# Patient Record
Sex: MALE | URBAN - METROPOLITAN AREA
[De-identification: names, ages, dates, MRNs, and addresses within clinical notes are randomized per-mention and may not be internally consistent; named-entity substitution may affect disease eponyms.]

---

## 2017-11-01 ENCOUNTER — OUTPATIENT (OUTPATIENT)
Dept: OUTPATIENT SERVICES | Facility: HOSPITAL | Age: 62
LOS: 1 days | End: 2017-11-01
Payer: MEDICAID

## 2017-11-01 PROCEDURE — G9001: CPT

## 2017-11-15 ENCOUNTER — EMERGENCY (EMERGENCY)
Facility: HOSPITAL | Age: 62
LOS: 1 days | Discharge: HOME | End: 2017-11-15
Attending: EMERGENCY MEDICINE | Admitting: INTERNAL MEDICINE
Payer: MEDICAID

## 2017-11-15 VITALS — RESPIRATION RATE: 16 BRPM | HEART RATE: 84 BPM | SYSTOLIC BLOOD PRESSURE: 148 MMHG | DIASTOLIC BLOOD PRESSURE: 78 MMHG

## 2017-11-15 DIAGNOSIS — R07.9 CHEST PAIN, UNSPECIFIED: ICD-10-CM

## 2017-11-15 LAB
ALBUMIN SERPL ELPH-MCNC: 4.8 G/DL — SIGNIFICANT CHANGE UP (ref 3.3–5)
ALP SERPL-CCNC: 54 U/L — SIGNIFICANT CHANGE UP (ref 40–120)
ALT FLD-CCNC: 21 U/L RC — SIGNIFICANT CHANGE UP (ref 10–45)
ANION GAP SERPL CALC-SCNC: 12 MMOL/L — SIGNIFICANT CHANGE UP (ref 5–17)
APPEARANCE UR: CLEAR — SIGNIFICANT CHANGE UP
APTT BLD: 35.9 SEC — SIGNIFICANT CHANGE UP (ref 27.5–37.4)
AST SERPL-CCNC: 21 U/L — SIGNIFICANT CHANGE UP (ref 10–40)
BASOPHILS # BLD AUTO: 0 K/UL — SIGNIFICANT CHANGE UP (ref 0–0.2)
BASOPHILS NFR BLD AUTO: 0.2 % — SIGNIFICANT CHANGE UP (ref 0–2)
BILIRUB SERPL-MCNC: 0.3 MG/DL — SIGNIFICANT CHANGE UP (ref 0.2–1.2)
BILIRUB UR-MCNC: NEGATIVE — SIGNIFICANT CHANGE UP
BUN SERPL-MCNC: 14 MG/DL — SIGNIFICANT CHANGE UP (ref 7–23)
CALCIUM SERPL-MCNC: 9.9 MG/DL — SIGNIFICANT CHANGE UP (ref 8.4–10.5)
CHLORIDE SERPL-SCNC: 104 MMOL/L — SIGNIFICANT CHANGE UP (ref 96–108)
CO2 SERPL-SCNC: 29 MMOL/L — SIGNIFICANT CHANGE UP (ref 22–31)
COLOR SPEC: SIGNIFICANT CHANGE UP
CREAT SERPL-MCNC: 1.12 MG/DL — SIGNIFICANT CHANGE UP (ref 0.5–1.3)
DIFF PNL FLD: NEGATIVE — SIGNIFICANT CHANGE UP
EOSINOPHIL # BLD AUTO: 0.2 K/UL — SIGNIFICANT CHANGE UP (ref 0–0.5)
EOSINOPHIL NFR BLD AUTO: 3.8 % — SIGNIFICANT CHANGE UP (ref 0–6)
ERYTHROCYTE [SEDIMENTATION RATE] IN BLOOD: 5 MM/HR — SIGNIFICANT CHANGE UP (ref 0–20)
GAS PNL BLDV: SIGNIFICANT CHANGE UP
GLUCOSE BLDC GLUCOMTR-MCNC: 163 MG/DL — HIGH (ref 70–99)
GLUCOSE BLDC GLUCOMTR-MCNC: 94 MG/DL — SIGNIFICANT CHANGE UP (ref 70–99)
GLUCOSE SERPL-MCNC: 133 MG/DL — HIGH (ref 70–99)
GLUCOSE UR QL: NEGATIVE — SIGNIFICANT CHANGE UP
HCT VFR BLD CALC: 43 % — SIGNIFICANT CHANGE UP (ref 39–50)
HGB BLD-MCNC: 14.6 G/DL — SIGNIFICANT CHANGE UP (ref 13–17)
INR BLD: 1.01 RATIO — SIGNIFICANT CHANGE UP (ref 0.88–1.16)
KETONES UR-MCNC: NEGATIVE — SIGNIFICANT CHANGE UP
LEUKOCYTE ESTERASE UR-ACNC: NEGATIVE — SIGNIFICANT CHANGE UP
LYMPHOCYTES # BLD AUTO: 1.7 K/UL — SIGNIFICANT CHANGE UP (ref 1–3.3)
LYMPHOCYTES # BLD AUTO: 31.6 % — SIGNIFICANT CHANGE UP (ref 13–44)
MAGNESIUM SERPL-MCNC: 2.3 MG/DL — SIGNIFICANT CHANGE UP (ref 1.6–2.6)
MCHC RBC-ENTMCNC: 31.4 PG — SIGNIFICANT CHANGE UP (ref 27–34)
MCHC RBC-ENTMCNC: 34.1 GM/DL — SIGNIFICANT CHANGE UP (ref 32–36)
MCV RBC AUTO: 92.2 FL — SIGNIFICANT CHANGE UP (ref 80–100)
MONOCYTES # BLD AUTO: 0.4 K/UL — SIGNIFICANT CHANGE UP (ref 0–0.9)
MONOCYTES NFR BLD AUTO: 7.1 % — SIGNIFICANT CHANGE UP (ref 2–14)
NEUTROPHILS # BLD AUTO: 3 K/UL — SIGNIFICANT CHANGE UP (ref 1.8–7.4)
NEUTROPHILS NFR BLD AUTO: 57.4 % — SIGNIFICANT CHANGE UP (ref 43–77)
NITRITE UR-MCNC: NEGATIVE — SIGNIFICANT CHANGE UP
PH UR: 7 — SIGNIFICANT CHANGE UP (ref 5–8)
PLATELET # BLD AUTO: 163 K/UL — SIGNIFICANT CHANGE UP (ref 150–400)
POTASSIUM SERPL-MCNC: 4.5 MMOL/L — SIGNIFICANT CHANGE UP (ref 3.5–5.3)
POTASSIUM SERPL-SCNC: 4.5 MMOL/L — SIGNIFICANT CHANGE UP (ref 3.5–5.3)
PROT SERPL-MCNC: 7.6 G/DL — SIGNIFICANT CHANGE UP (ref 6–8.3)
PROT UR-MCNC: NEGATIVE — SIGNIFICANT CHANGE UP
PROTHROM AB SERPL-ACNC: 10.9 SEC — SIGNIFICANT CHANGE UP (ref 9.8–12.7)
RBC # BLD: 4.66 M/UL — SIGNIFICANT CHANGE UP (ref 4.2–5.8)
RBC # FLD: 12 % — SIGNIFICANT CHANGE UP (ref 10.3–14.5)
SODIUM SERPL-SCNC: 145 MMOL/L — SIGNIFICANT CHANGE UP (ref 135–145)
SP GR SPEC: 1.01 — SIGNIFICANT CHANGE UP (ref 1.01–1.02)
TROPONIN T SERPL-MCNC: <0.01 NG/ML — SIGNIFICANT CHANGE UP (ref 0–0.06)
TROPONIN T SERPL-MCNC: <0.01 NG/ML — SIGNIFICANT CHANGE UP (ref 0–0.06)
UROBILINOGEN FLD QL: NEGATIVE — SIGNIFICANT CHANGE UP
WBC # BLD: 5.2 K/UL — SIGNIFICANT CHANGE UP (ref 3.8–10.5)
WBC # FLD AUTO: 5.2 K/UL — SIGNIFICANT CHANGE UP (ref 3.8–10.5)

## 2017-11-15 RX ORDER — DEXTROSE 50 % IN WATER 50 %
1 SYRINGE (ML) INTRAVENOUS ONCE
Qty: 0 | Refills: 0 | Status: DISCONTINUED | OUTPATIENT
Start: 2017-11-15 | End: 2017-11-17

## 2017-11-15 RX ORDER — DEXTROSE 50 % IN WATER 50 %
12.5 SYRINGE (ML) INTRAVENOUS ONCE
Qty: 0 | Refills: 0 | Status: DISCONTINUED | OUTPATIENT
Start: 2017-11-15 | End: 2017-11-17

## 2017-11-15 RX ORDER — TAMSULOSIN HYDROCHLORIDE 0.4 MG/1
0.4 CAPSULE ORAL AT BEDTIME
Qty: 0 | Refills: 0 | Status: DISCONTINUED | OUTPATIENT
Start: 2017-11-15 | End: 2017-11-17

## 2017-11-15 RX ORDER — DEXTROSE 50 % IN WATER 50 %
25 SYRINGE (ML) INTRAVENOUS ONCE
Qty: 0 | Refills: 0 | Status: DISCONTINUED | OUTPATIENT
Start: 2017-11-15 | End: 2017-11-17

## 2017-11-15 RX ORDER — LOSARTAN POTASSIUM 100 MG/1
25 TABLET, FILM COATED ORAL DAILY
Qty: 0 | Refills: 0 | Status: DISCONTINUED | OUTPATIENT
Start: 2017-11-15 | End: 2017-11-17

## 2017-11-15 RX ORDER — SODIUM CHLORIDE 9 MG/ML
1000 INJECTION, SOLUTION INTRAVENOUS
Qty: 0 | Refills: 0 | Status: DISCONTINUED | OUTPATIENT
Start: 2017-11-15 | End: 2017-11-17

## 2017-11-15 RX ORDER — ASPIRIN/CALCIUM CARB/MAGNESIUM 324 MG
81 TABLET ORAL DAILY
Qty: 0 | Refills: 0 | Status: DISCONTINUED | OUTPATIENT
Start: 2017-11-15 | End: 2017-11-17

## 2017-11-15 RX ORDER — INSULIN LISPRO 100/ML
VIAL (ML) SUBCUTANEOUS
Qty: 0 | Refills: 0 | Status: DISCONTINUED | OUTPATIENT
Start: 2017-11-15 | End: 2017-11-17

## 2017-11-15 RX ORDER — SODIUM CHLORIDE 9 MG/ML
1000 INJECTION INTRAMUSCULAR; INTRAVENOUS; SUBCUTANEOUS ONCE
Qty: 0 | Refills: 0 | Status: COMPLETED | OUTPATIENT
Start: 2017-11-15 | End: 2017-11-15

## 2017-11-15 RX ORDER — GLUCAGON INJECTION, SOLUTION 0.5 MG/.1ML
1 INJECTION, SOLUTION SUBCUTANEOUS ONCE
Qty: 0 | Refills: 0 | Status: DISCONTINUED | OUTPATIENT
Start: 2017-11-15 | End: 2017-11-17

## 2017-11-15 RX ORDER — ACETAMINOPHEN 500 MG
650 TABLET ORAL ONCE
Qty: 0 | Refills: 0 | Status: COMPLETED | OUTPATIENT
Start: 2017-11-15 | End: 2017-11-15

## 2017-11-15 RX ORDER — DIAZEPAM 5 MG
5 TABLET ORAL ONCE
Qty: 0 | Refills: 0 | Status: DISCONTINUED | OUTPATIENT
Start: 2017-11-15 | End: 2017-11-15

## 2017-11-15 RX ORDER — HEPARIN SODIUM 5000 [USP'U]/ML
5000 INJECTION INTRAVENOUS; SUBCUTANEOUS EVERY 12 HOURS
Qty: 0 | Refills: 0 | Status: DISCONTINUED | OUTPATIENT
Start: 2017-11-15 | End: 2017-11-17

## 2017-11-15 RX ORDER — SIMVASTATIN 20 MG/1
20 TABLET, FILM COATED ORAL AT BEDTIME
Qty: 0 | Refills: 0 | Status: DISCONTINUED | OUTPATIENT
Start: 2017-11-15 | End: 2017-11-17

## 2017-11-15 RX ADMIN — SODIUM CHLORIDE 2000 MILLILITER(S): 9 INJECTION INTRAMUSCULAR; INTRAVENOUS; SUBCUTANEOUS at 08:25

## 2017-11-15 RX ADMIN — Medication 650 MILLIGRAM(S): at 16:24

## 2017-11-15 RX ADMIN — Medication 5 MILLIGRAM(S): at 16:24

## 2017-11-15 RX ADMIN — SIMVASTATIN 20 MILLIGRAM(S): 20 TABLET, FILM COATED ORAL at 21:25

## 2017-11-15 RX ADMIN — TAMSULOSIN HYDROCHLORIDE 0.4 MILLIGRAM(S): 0.4 CAPSULE ORAL at 21:29

## 2017-11-15 RX ADMIN — HEPARIN SODIUM 5000 UNIT(S): 5000 INJECTION INTRAVENOUS; SUBCUTANEOUS at 21:25

## 2017-11-15 NOTE — CONSULT NOTE ADULT - SUBJECTIVE AND OBJECTIVE BOX
Neurology Consult    Name  ROSALIO DALLAS    Patient is 62 year old male w/ PMHx DM, HTN, HLD, presenting with progressively worsening headache and neck pain.  He reports that he started to get a headache around 2 months ago, R sided, but he was not feeling so bad, so he did not see anyone regarding it.  He was starting to progressively get worse over the last 2 weeks, and he began to feel pain on the right posterior of his neck.  The pain was also associated with soft tissue swelling and fever.  He went to an herbal/eastern medicine practitioner who administered treatment to his neck, which caused the swelling to subside.  He does still endorse pain in his head/neck, however he reports that it is better than it was previously.  He reports pain in his neck to palpation.  He also has some paresthesias in his right upper/lower extremities.  Patient denies any vision changes, weakness or other neurological symptoms.                                                             MEDICATIONS  (STANDING):    MEDICATIONS  (PRN):      Allergies    Allergy Status Unknown    Intolerances        Objective  Vital Signs Last 24 Hrs  T(C): 36.7 (15 Nov 2017 08:28), Max: 36.7 (15 Nov 2017 08:28)  T(F): 98.1 (15 Nov 2017 08:28), Max: 98.1 (15 Nov 2017 08:28)  HR: 67 (15 Nov 2017 08:28) (67 - 84)  BP: 121/64 (15 Nov 2017 08:28) (121/64 - 148/78)  BP(mean): --  RR: 16 (15 Nov 2017 08:28) (16 - 16)  SpO2: 97% (15 Nov 2017 08:28) (97% - 97%)    General Exam   General appearance: No acute distress, well-nourished  Respiratory:    non-labored respirations               Neurological Exam  Mental Status:  alert and oriented x3, fluent speech, following commands, repetition and naming intact    Cranial Nerves: PERRL, EOMI without nystagmus, visual fields intact no facial droop, no dysarthria    Motor:   Tone:   normal               Strength:  Upper extremity                          Delt       Bicep    Tricep                                                  R         5/5        5/5        5/5       5/5                                               L          5/5        5/5        5/5      5/5    Lower extremity                           HF          KE          KF        DF         PF                                               R        5/5        5/5        5/5       5/5       5/5                                               L         5/5        5/5        5/5      5/5        5/5    Pronator drift:   none           Dysmetria: none with finger-to-nose testing  Tremor:  none appreciated at rest or in action    Sensation: intact grossly to light touch    Deep Tendon Reflexes:  2+ throughout  Toes flexor bilaterally     Gait: normal, on toe, heel and tandem walking    Other Studies    11-15    145  |  104  |  14  ----------------------------<  133<H>  4.5   |  29  |  1.12    Ca    9.9      15 Nov 2017 08:32  Mg     2.3     11-15    TPro  7.6  /  Alb  4.8  /  TBili  0.3  /  DBili  x   /  AST  21  /  ALT  21  /  AlkPhos  54  11-15    11-15    145  |  104  |  14  ----------------------------<  133<H>  4.5   |  29  |  1.12    Ca    9.9      15 Nov 2017 08:32  Mg     2.3     11-15    TPro  7.6  /  Alb  4.8  /  TBili  0.3  /  DBili  x   /  AST  21  /  ALT  21  /  AlkPhos  54  11-15    LIVER FUNCTIONS - ( 15 Nov 2017 08:32 )  Alb: 4.8 g/dL / Pro: 7.6 g/dL / ALK PHOS: 54 U/L / ALT: 21 U/L RC / AST: 21 U/L / GGT: x             Radiology    CT:  No intracranial hemorrhage.  No cervical spine fracture.

## 2017-11-15 NOTE — ED PROVIDER NOTE - OBJECTIVE STATEMENT
[place]  [id number]  Private Physician SANDEEP TIFFANY  62y male DMT2,HTN,HLD,no tobacco. Pt comes to ed complains of 2-3 week hx of HA, with "pulling pain rt neck and shoulder with numbness. With tongue stiffness" No nausea and vomiting,fever. Ha constant. worse when doesn't take bp meds, No alleviating factors.Pain sharp 8/10. No trauma. Hx of ha in past not as severe. those responded to tylenol.. No hx cancer,travel. Employed as . Croatian  [id number]  Private Physician SANDEEP TIFFANY  62y male DMT2,HTN,HLD,no tobacco. Pt comes to ed complains of 2-3 week hx of HA, with "pulling pain rt neck and shoulder with numbness. With tongue stiffness" No nausea and vomiting,fever. Ha constant. worse when doesn't take bp meds, No alleviating factors.Pain sharp 8/10. No trauma. Hx of ha in past not as severe. those responded to tylenol.. No hx cancer,travel. Employed as .    Saige: Patient also with cp on exertion with 10 stairs in last week. No prior provocative testing. No diaphoresis, sob, f/c, LE edema, palp. +dry cough. Former smoker.

## 2017-11-15 NOTE — ED ADULT NURSE NOTE - OBJECTIVE STATEMENT
61 y/o male Turkmen speaking presenting to the ED via EMS complaining of headache x2-3 weeks;  phone used with this nurse, patient and DR. Dominique; Patient states pain is sharp in head;  Patient states "I have a pulling pain in the right side of my neck and shoulder with numbness"; Patient also complaining of tongue stiffness; Patient states headache worse when does not take BP meds; Patient denies fevers, SOB, chest pain, n/v/d, vision changes; Patient denies sick contacts and recent travel; No JVD noted; No weakness noted; a&ox3; safety and comfort measures provided; bed placed in lowest position; call bell within reach; patient placed on CM; EKG done on arrival

## 2017-11-15 NOTE — H&P ADULT - ASSESSMENT
pt w/ hx dm/ htn/ hld c/o h/as neck pain   r/o acute neuro event  neuro eval  ct head neg  check mri / head / neck  cont outpt meds  esr/ crp  dvt proph  analgesics   dm fsg riss  check tsh/ hgaic

## 2017-11-15 NOTE — ED ADULT NURSE REASSESSMENT NOTE - NS ED NURSE REASSESS COMMENT FT1
Patient appears to be resting comfortably in stretcher; Patient complaining of neck pain at this time; Patient medicated as per order; Patient denies numbness, tingling, SOB, dizziness, chest pain; CM in place; a&ox3; safety and comfort measures provided

## 2017-11-15 NOTE — CONSULT NOTE ADULT - ATTENDING COMMENTS
As above.  Exam this am reveals no focal CN or motor deficits.  Pain is localized to righ posterior nuchal and posterior auricular area.  Would proceed with MRI head and MRI soft tissues of the neck.

## 2017-11-15 NOTE — H&P ADULT - HISTORY OF PRESENT ILLNESS
62yr old male DMT2,HTN,HLD,. Pt comes to ed complains of 2-3 week hx of HA, with "pulling pain rt neck and shoulder with numbness. With tongue stiffness" No nausea and vomiting, no fever. , No alleviating factors.Pain sharp 8/10. No trauma. Hx of ha in past not as severe.  no cp or sob   no chills   no visual changes

## 2017-11-15 NOTE — CONSULT NOTE ADULT - ASSESSMENT
62 year old male w/ PMHx DM, HTN, HLD, presenting with progressively worsening headache and neck pain. neurological exam non-focal, CT head/neck showing no acute abnormalities.  Symptoms possibly due to soft tissue infectious/inflammatory process.    Plan:  - check ESR, CRP for possible inflammatory process  - consider MRI neck for soft tissue imaging, may be done on an outpatient basis

## 2017-11-15 NOTE — ED PROVIDER NOTE - CARE PLAN
Principal Discharge DX:	Exertional chest pain  Secondary Diagnosis:	Numbness and tingling in right hand  Secondary Diagnosis:	Acute non intractable tension-type headache

## 2017-11-15 NOTE — ED PROVIDER NOTE - MEDICAL DECISION MAKING DETAILS
Bartholomew with neck pain mod severe ro ich, check ct head/neck, neuro consult check labs  re-assess Bartholomew with neck pain mod severe ro ich, check ct head/neck, neuro consult check labs  re-assess    Saige: 62M w/DM, htn, hld p/w headache, neck pain R>L, R hand numbness/weakness, speech difficulty x2w. Also scratchy throat/dry cough but no fever. +cp when climbs 10 stairs in last week. No sob. No prior stress tests. Neuro symptoms have been constant. No change with movement/neck manipulation/lifting. R/o CVA/TIA, possible cervical stenosis w/neuropathy, r/o ACS/arrhythmia, PNA, UTI in setting of gen weakness. Labs, EKG, CTH/Cspine, neuro c/s, tele, reassess.

## 2017-11-16 LAB
CRP SERPL-MCNC: <0.2 MG/DL — SIGNIFICANT CHANGE UP (ref 0–0.4)
GLUCOSE BLDC GLUCOMTR-MCNC: 121 MG/DL — HIGH (ref 70–99)
GLUCOSE BLDC GLUCOMTR-MCNC: 130 MG/DL — HIGH (ref 70–99)
GLUCOSE BLDC GLUCOMTR-MCNC: 161 MG/DL — HIGH (ref 70–99)
GLUCOSE BLDC GLUCOMTR-MCNC: 164 MG/DL — HIGH (ref 70–99)
HBA1C BLD-MCNC: 6.1 % — HIGH (ref 4–5.6)
TROPONIN T SERPL-MCNC: <0.01 NG/ML — SIGNIFICANT CHANGE UP (ref 0–0.06)
TSH SERPL-MCNC: 1.81 UIU/ML — SIGNIFICANT CHANGE UP (ref 0.27–4.2)

## 2017-11-16 RX ADMIN — Medication 2: at 17:19

## 2017-11-16 RX ADMIN — HEPARIN SODIUM 5000 UNIT(S): 5000 INJECTION INTRAVENOUS; SUBCUTANEOUS at 22:35

## 2017-11-16 RX ADMIN — LOSARTAN POTASSIUM 25 MILLIGRAM(S): 100 TABLET, FILM COATED ORAL at 05:30

## 2017-11-16 RX ADMIN — Medication 81 MILLIGRAM(S): at 05:30

## 2017-11-16 RX ADMIN — TAMSULOSIN HYDROCHLORIDE 0.4 MILLIGRAM(S): 0.4 CAPSULE ORAL at 22:35

## 2017-11-16 RX ADMIN — SIMVASTATIN 20 MILLIGRAM(S): 20 TABLET, FILM COATED ORAL at 22:35

## 2017-11-16 NOTE — PHYSICAL THERAPY INITIAL EVALUATION ADULT - ADDITIONAL COMMENTS
11/15/17 CT head: No intracranial hemorrhage. No cervical spine fracture. XR chest: Normal chest radiograph.

## 2017-11-16 NOTE — CONSULT NOTE ADULT - ASSESSMENT
rule out cva  fu with neuorology  fu MRI  if pos cva then will need echo, nell and possible ILR    HTN  stable    DM  Monitor finger stick. Insulin coverage. Diabetic education and Diabetic diet. Consider nutrition consultation.

## 2017-11-16 NOTE — PROGRESS NOTE ADULT - SUBJECTIVE AND OBJECTIVE BOX
CHIEF COMPLAINT:symptoms improved  	        PAST MEDICAL & SURGICAL HISTORY:  HLD (hyperlipidemia)  HTN (hypertension)  DM (diabetes mellitus)          REVIEW OF SYSTEMS:  CONSTITUTIONAL: No fever, weight loss, or fatigue  EYES: No eye pain, visual disturbances, or discharge  NECK: less pain   RESPIRATORY: No cough, wheezing, chills or hemoptysis; No Shortness of Breath  CARDIOVASCULAR: No chest pain, palpitations, passing out, dizziness, or leg swelling  GASTROINTESTINAL: No abdominal or epigastric pain. No nausea, vomiting, or hematemesis; No diarrhea or constipation. No melena or hematochezia.  GENITOURINARY: No dysuria, frequency, hematuria, or incontinence  NEUROLOGICAL: dec h/a sno  memory loss, loss of strength, numbness, or tremors  SKIN: No itching, burning, rashes, or lesions   LYMPH Nodes: No enlarged glands  ENDOCRINE: No heat or cold intolerance; No hair loss  MUSCULOSKELETAL: No joint pain or swelling; No muscle, back, or extremity pain    Medications:  MEDICATIONS  (STANDING):  aspirin enteric coated 81 milliGRAM(s) Oral daily  dextrose 5%. 1000 milliLiter(s) (50 mL/Hr) IV Continuous <Continuous>  dextrose 50% Injectable 12.5 Gram(s) IV Push once  dextrose 50% Injectable 25 Gram(s) IV Push once  dextrose 50% Injectable 25 Gram(s) IV Push once  heparin  Injectable 5000 Unit(s) SubCutaneous every 12 hours  insulin lispro (HumaLOG) corrective regimen sliding scale   SubCutaneous three times a day before meals  losartan 25 milliGRAM(s) Oral daily  simvastatin 20 milliGRAM(s) Oral at bedtime  tamsulosin 0.4 milliGRAM(s) Oral at bedtime    MEDICATIONS  (PRN):  dextrose Gel 1 Dose(s) Oral once PRN Blood Glucose LESS THAN 70 milliGRAM(s)/deciliter  glucagon  Injectable 1 milliGRAM(s) IntraMuscular once PRN Glucose LESS THAN 70 milligrams/deciliter    	    PHYSICAL EXAM:  T(C): 36.4 (11-16-17 @ 05:28), Max: 36.9 (11-15-17 @ 20:31)  HR: 59 (11-16-17 @ 05:28) (59 - 80)  BP: 104/66 (11-16-17 @ 05:28) (104/66 - 116/69)  RR: 18 (11-16-17 @ 05:28) (15 - 18)  SpO2: 95% (11-16-17 @ 05:28) (95% - 98%)  Wt(kg): --  I&O's Summary    15 Nov 2017 07:01  -  16 Nov 2017 07:00  --------------------------------------------------------  IN: 50 mL / OUT: 0 mL / NET: 50 mL    16 Nov 2017 07:01  -  16 Nov 2017 11:07  --------------------------------------------------------  IN: 320 mL / OUT: 0 mL / NET: 320 mL        Appearance: Normal	  HEENT:   Normal oral mucosa, PERRL, EOMI	  Lymphatic: No lymphadenopathy  Cardiovascular: Normal S1 S2, No JVD, No murmurs, No edema  Respiratory: Lungs clear to auscultation	  Psychiatry: A & O x 3, Mood & affect appropriate  Gastrointestinal:  Soft, Non-tender, + BS	  Skin: No rashes, No ecchymoses, No cyanosis	  Neurologic: Non-focal  Extremities: Normal range of motion, No clubbing, cyanosis or edema  Vascular: Peripheral pulses palpable 2+ bilaterally    TELEMETRY: 	    ECG:  	  RADIOLOGY:  OTHER: 	  	  LABS:	 	    CARDIAC MARKERS:  CARDIAC MARKERS ( 16 Nov 2017 05:19 )  x     / <0.01 ng/mL / x     / x     / x      CARDIAC MARKERS ( 15 Nov 2017 17:35 )  x     / <0.01 ng/mL / x     / x     / x      CARDIAC MARKERS ( 15 Nov 2017 08:32 )  x     / <0.01 ng/mL / x     / x     / x                                    14.6   5.2   )-----------( 163      ( 15 Nov 2017 08:32 )             43.0     11-15    145  |  104  |  14  ----------------------------<  133<H>  4.5   |  29  |  1.12    Ca    9.9      15 Nov 2017 08:32  Mg     2.3     11-15    TPro  7.6  /  Alb  4.8  /  TBili  0.3  /  DBili  x   /  AST  21  /  ALT  21  /  AlkPhos  54  11-15    proBNP:   Lipid Profile:   HgA1c: Hemoglobin A1C, Whole Blood: 6.1 % (11-16 @ 00:16)    TSH: Thyroid Stimulating Hormone, Serum: 1.81 uIU/mL (11-15 @ 22:02)

## 2017-11-16 NOTE — PROGRESS NOTE ADULT - ASSESSMENT
pt w/ hx dm/ htn/ hld c/o h/as neck pain   r/o acute neuro event  neuro f/u   ct head neg  check mri / head / neck if neg likely d/c   cont outpt meds  esr/ crp  dvt proph  analgesics   dm fsg riss  pt

## 2017-11-16 NOTE — CONSULT NOTE ADULT - SUBJECTIVE AND OBJECTIVE BOX
CHIEF COMPLAINT:Patient is a 62y old  Male who presents with a chief complaint of weakness    HISTORY OF PRESENT ILLNESS:  62 year old male w/ PMHx DM, HTN, HLD, presenting with progressively worsening headache and neck pain. being ruled out for CVA  denies any chest pain, sob, palpitation, dizziness or syncope.       PAST MEDICAL & SURGICAL HISTORY:  HLD (hyperlipidemia)  HTN (hypertension)  DM (diabetes mellitus)          MEDICATIONS:  aspirin enteric coated 81 milliGRAM(s) Oral daily  heparin  Injectable 5000 Unit(s) SubCutaneous every 12 hours  losartan 25 milliGRAM(s) Oral daily  tamsulosin 0.4 milliGRAM(s) Oral at bedtime            dextrose 50% Injectable 12.5 Gram(s) IV Push once  dextrose 50% Injectable 25 Gram(s) IV Push once  dextrose 50% Injectable 25 Gram(s) IV Push once  dextrose Gel 1 Dose(s) Oral once PRN  glucagon  Injectable 1 milliGRAM(s) IntraMuscular once PRN  insulin lispro (HumaLOG) corrective regimen sliding scale   SubCutaneous three times a day before meals  simvastatin 20 milliGRAM(s) Oral at bedtime    dextrose 5%. 1000 milliLiter(s) IV Continuous <Continuous>      FAMILY HISTORY:  No pertinent family history in first degree relatives      Non-contributory    SOCIAL HISTORY:    No tobacco, drugs or etoh    Allergies    Allergy Status Unknown    Intolerances    	    REVIEW OF SYSTEMS:  as above  The rest of the 14 points ROS reviewed and except above they are unremarkable.        PHYSICAL EXAM:  T(C): 36.4 (11-16-17 @ 05:28), Max: 36.9 (11-15-17 @ 20:31)  HR: 59 (11-16-17 @ 05:28) (59 - 80)  BP: 104/66 (11-16-17 @ 05:28) (104/66 - 116/69)  RR: 18 (11-16-17 @ 05:28) (15 - 18)  SpO2: 95% (11-16-17 @ 05:28) (95% - 98%)  Wt(kg): --  I&O's Summary    15 Nov 2017 07:01  -  16 Nov 2017 07:00  --------------------------------------------------------  IN: 50 mL / OUT: 0 mL / NET: 50 mL    16 Nov 2017 07:01  -  16 Nov 2017 14:16  --------------------------------------------------------  IN: 560 mL / OUT: 0 mL / NET: 560 mL        Appearance: Normal	  HEENT:   Normal oral mucosa, PERRL, EOMI	  Cardiovascular: Normal S1 S2,    Murmur:   Neck: JVP normal  Respiratory: Lungs clear to auscultation  Gastrointestinal:  Soft, Non-tender, + BS	  Skin: normal   Neuro: No gross deficits.   Psychiatry:  Mood & affect appropriate  Ext: No edema    LABS/DATA:    TELEMETRY: 	    ECG:  	   	  CARDIAC MARKERS:  Troponin T, Serum: <0.01 ng/mL (11-16 @ 05:19)  Troponin T, Serum: <0.01 ng/mL (11-15 @ 17:35)  Troponin T, Serum: <0.01 ng/mL (11-15 @ 08:32)                                  14.6   5.2   )-----------( 163      ( 15 Nov 2017 08:32 )             43.0     11-15    145  |  104  |  14  ----------------------------<  133<H>  4.5   |  29  |  1.12    Ca    9.9      15 Nov 2017 08:32  Mg     2.3     11-15    TPro  7.6  /  Alb  4.8  /  TBili  0.3  /  DBili  x   /  AST  21  /  ALT  21  /  AlkPhos  54  11-15    proBNP:   Lipid Profile:   HgA1c: Hemoglobin A1C, Whole Blood: 6.1 % (11-16 @ 00:16)    TSH: Thyroid Stimulating Hormone, Serum: 1.81 uIU/mL (11-15 @ 22:02)

## 2017-11-16 NOTE — PHYSICAL THERAPY INITIAL EVALUATION ADULT - PERTINENT HX OF CURRENT PROBLEM, REHAB EVAL
62 year old male w/ PMHx DM, HTN, HLD, presenting with progressively worsening headache and neck pain. neurological exam non-focal, CT head/neck showing no acute abnormalities.  Symptoms possibly due to soft tissue infectious/inflammatory process.  Patient also with cp on exertion with 10 stairs in last week.

## 2017-11-17 ENCOUNTER — TRANSCRIPTION ENCOUNTER (OUTPATIENT)
Age: 62
End: 2017-11-17

## 2017-11-17 VITALS
DIASTOLIC BLOOD PRESSURE: 68 MMHG | HEART RATE: 109 BPM | SYSTOLIC BLOOD PRESSURE: 104 MMHG | OXYGEN SATURATION: 97 % | TEMPERATURE: 98 F

## 2017-11-17 LAB
ANION GAP SERPL CALC-SCNC: 12 MMOL/L — SIGNIFICANT CHANGE UP (ref 5–17)
BUN SERPL-MCNC: 14 MG/DL — SIGNIFICANT CHANGE UP (ref 7–23)
CALCIUM SERPL-MCNC: 9.4 MG/DL — SIGNIFICANT CHANGE UP (ref 8.4–10.5)
CHLORIDE SERPL-SCNC: 106 MMOL/L — SIGNIFICANT CHANGE UP (ref 96–108)
CO2 SERPL-SCNC: 24 MMOL/L — SIGNIFICANT CHANGE UP (ref 22–31)
CREAT SERPL-MCNC: 1.14 MG/DL — SIGNIFICANT CHANGE UP (ref 0.5–1.3)
GLUCOSE BLDC GLUCOMTR-MCNC: 108 MG/DL — HIGH (ref 70–99)
GLUCOSE BLDC GLUCOMTR-MCNC: 142 MG/DL — HIGH (ref 70–99)
GLUCOSE SERPL-MCNC: 147 MG/DL — HIGH (ref 70–99)
HCT VFR BLD CALC: 43.8 % — SIGNIFICANT CHANGE UP (ref 39–50)
HGB BLD-MCNC: 15.1 G/DL — SIGNIFICANT CHANGE UP (ref 13–17)
MCHC RBC-ENTMCNC: 31.7 PG — SIGNIFICANT CHANGE UP (ref 27–34)
MCHC RBC-ENTMCNC: 34.6 GM/DL — SIGNIFICANT CHANGE UP (ref 32–36)
MCV RBC AUTO: 91.5 FL — SIGNIFICANT CHANGE UP (ref 80–100)
PLATELET # BLD AUTO: 161 K/UL — SIGNIFICANT CHANGE UP (ref 150–400)
POTASSIUM SERPL-MCNC: 4.3 MMOL/L — SIGNIFICANT CHANGE UP (ref 3.5–5.3)
POTASSIUM SERPL-SCNC: 4.3 MMOL/L — SIGNIFICANT CHANGE UP (ref 3.5–5.3)
RBC # BLD: 4.78 M/UL — SIGNIFICANT CHANGE UP (ref 4.2–5.8)
RBC # FLD: 11.9 % — SIGNIFICANT CHANGE UP (ref 10.3–14.5)
SODIUM SERPL-SCNC: 142 MMOL/L — SIGNIFICANT CHANGE UP (ref 135–145)
WBC # BLD: 6.4 K/UL — SIGNIFICANT CHANGE UP (ref 3.8–10.5)
WBC # FLD AUTO: 6.4 K/UL — SIGNIFICANT CHANGE UP (ref 3.8–10.5)

## 2017-11-17 PROCEDURE — 82435 ASSAY OF BLOOD CHLORIDE: CPT

## 2017-11-17 PROCEDURE — G0378: CPT

## 2017-11-17 PROCEDURE — A9585: CPT

## 2017-11-17 PROCEDURE — 71046 X-RAY EXAM CHEST 2 VIEWS: CPT

## 2017-11-17 PROCEDURE — 81001 URINALYSIS AUTO W/SCOPE: CPT

## 2017-11-17 PROCEDURE — 84295 ASSAY OF SERUM SODIUM: CPT

## 2017-11-17 PROCEDURE — 82330 ASSAY OF CALCIUM: CPT

## 2017-11-17 PROCEDURE — 82962 GLUCOSE BLOOD TEST: CPT

## 2017-11-17 PROCEDURE — 82947 ASSAY GLUCOSE BLOOD QUANT: CPT

## 2017-11-17 PROCEDURE — 86140 C-REACTIVE PROTEIN: CPT

## 2017-11-17 PROCEDURE — 70553 MRI BRAIN STEM W/O & W/DYE: CPT

## 2017-11-17 PROCEDURE — 83036 HEMOGLOBIN GLYCOSYLATED A1C: CPT

## 2017-11-17 PROCEDURE — 80048 BASIC METABOLIC PNL TOTAL CA: CPT

## 2017-11-17 PROCEDURE — 70549 MR ANGIOGRAPH NECK W/O&W/DYE: CPT

## 2017-11-17 PROCEDURE — 80053 COMPREHEN METABOLIC PANEL: CPT

## 2017-11-17 PROCEDURE — 84443 ASSAY THYROID STIM HORMONE: CPT

## 2017-11-17 PROCEDURE — 85652 RBC SED RATE AUTOMATED: CPT

## 2017-11-17 PROCEDURE — 93005 ELECTROCARDIOGRAM TRACING: CPT

## 2017-11-17 PROCEDURE — 93010 ELECTROCARDIOGRAM REPORT: CPT

## 2017-11-17 PROCEDURE — 85730 THROMBOPLASTIN TIME PARTIAL: CPT

## 2017-11-17 PROCEDURE — 70544 MR ANGIOGRAPHY HEAD W/O DYE: CPT

## 2017-11-17 PROCEDURE — 72125 CT NECK SPINE W/O DYE: CPT

## 2017-11-17 PROCEDURE — 84484 ASSAY OF TROPONIN QUANT: CPT

## 2017-11-17 PROCEDURE — 85610 PROTHROMBIN TIME: CPT

## 2017-11-17 PROCEDURE — 70450 CT HEAD/BRAIN W/O DYE: CPT

## 2017-11-17 PROCEDURE — 85027 COMPLETE CBC AUTOMATED: CPT

## 2017-11-17 PROCEDURE — 84132 ASSAY OF SERUM POTASSIUM: CPT

## 2017-11-17 PROCEDURE — 83735 ASSAY OF MAGNESIUM: CPT

## 2017-11-17 PROCEDURE — 82803 BLOOD GASES ANY COMBINATION: CPT

## 2017-11-17 PROCEDURE — 99285 EMERGENCY DEPT VISIT HI MDM: CPT | Mod: 25

## 2017-11-17 PROCEDURE — 83605 ASSAY OF LACTIC ACID: CPT

## 2017-11-17 PROCEDURE — 85014 HEMATOCRIT: CPT

## 2017-11-17 RX ORDER — ASPIRIN/CALCIUM CARB/MAGNESIUM 324 MG
1 TABLET ORAL
Qty: 0 | Refills: 0 | COMMUNITY
Start: 2017-11-17

## 2017-11-17 RX ORDER — SIMVASTATIN 20 MG/1
1 TABLET, FILM COATED ORAL
Qty: 0 | Refills: 0 | COMMUNITY
Start: 2017-11-17

## 2017-11-17 RX ORDER — LOSARTAN POTASSIUM 100 MG/1
1 TABLET, FILM COATED ORAL
Qty: 0 | Refills: 0 | COMMUNITY
Start: 2017-11-17

## 2017-11-17 RX ADMIN — HEPARIN SODIUM 5000 UNIT(S): 5000 INJECTION INTRAVENOUS; SUBCUTANEOUS at 05:07

## 2017-11-17 RX ADMIN — Medication 81 MILLIGRAM(S): at 13:39

## 2017-11-17 RX ADMIN — LOSARTAN POTASSIUM 25 MILLIGRAM(S): 100 TABLET, FILM COATED ORAL at 05:06

## 2017-11-17 NOTE — DISCHARGE NOTE ADULT - MEDICATION SUMMARY - MEDICATIONS TO TAKE
I will START or STAY ON the medications listed below when I get home from the hospital:    aspirin 81 mg oral delayed release tablet  -- 1 tab(s) by mouth once a day  -- Indication: For CAD    losartan 25 mg oral tablet  -- 1 tab(s) by mouth once a day  -- Indication: For Blood Pressure    Flomax 0.4 mg oral capsule  -- 2 cap(s) by mouth once a day  -- Indication: For BPH    Janumet 50 mg-1000 mg oral tablet  -- 1 tab(s) by mouth 2 times a day  -- Indication: For Diabetes    simvastatin 20 mg oral tablet  -- 1 tab(s) by mouth once a day (at bedtime)  -- Indication: For High Cholesterol    Artificial Tears ophthalmic solution  -- 1 drop(s) to each affected eye 4 times a day  -- Indication: For Topical agent    B Complex 50 oral tablet, extended release  -- 1 tab(s) by mouth once a day  -- Indication: For Vitamin

## 2017-11-17 NOTE — DISCHARGE NOTE ADULT - PLAN OF CARE
Patient remains hemodynamically stable. follow up with Neurologist, Dr. Gregory as an outpatient. Low salt diet  Activity as tolerated.  Take all medication as prescribed.  Follow up with your medical doctor for routine blood pressure monitoring at your next visit.  Notify your doctor if you have any of the following symptoms:   Dizziness, Lightheadedness, Blurry vision, Headache, Chest pain, Shortness of breath Continue with current medication. HgA1C this admission 6.1.  Make sure you get your HgA1c checked every three months.  If you take oral diabetes medications, check your blood glucose two times a day.  If you take insulin, check your blood glucose before meals and at bedtime.  It's important not to skip any meals.  Keep a log of your blood glucose results and always take it with you to your doctor appointments.  Keep a list of your current medications including injectables and over the counter medications and bring this medication list with you to all your doctor appointments.  If you have not seen your opthalmologist this year call for appointment.  Check your feet daily for redness, sores, or openings. Do not self treat. If no improvement in two days call your primary care physician for an appointment.  Low blood sugar (hypoglycemia) is a blood sugar below 70mg/dl. Check your blood sugar if you feel signs/symptoms of hypoglycemia. If your blood sugar is below 70 take 15 grams of carbohydrates (ex 4 oz of apple juice, 3-4 glucosr tablets, or 4-6 oz of regular soda) wait 15 minutes and repeat blood sugar to make sure it comes up above 70.  If your blood sugar is above 70 and you are due for a meal, have a meal.  If you are not due for a meal have a snack.  This snack helps keeps your blood sugar at a safe range. Follow up with Dr. Sorto for a echocardiogram.

## 2017-11-17 NOTE — PROGRESS NOTE ADULT - ASSESSMENT
pt w/ hx dm/ htn/ hld c/o h/as neck pain   cards f.u if cleares d/c home  neuro f/u noted / cleared for d/c  ct head neg  mris without acute evidence   cont outpt meds  dvt proph  analgesics   dm fsg riss  pt

## 2017-11-17 NOTE — PROGRESS NOTE ADULT - ASSESSMENT
CVA  ruled out    HTN  stable    DM  Monitor finger stick. Insulin coverage. Diabetic education and Diabetic diet. Consider nutrition consultation.

## 2017-11-17 NOTE — PROGRESS NOTE ADULT - ATTENDING COMMENTS
As above.  This am significantly less right nuchal/trapezius pain.  Imaging studies without acute pathology.  Nl power in ues and nl biceps and KJ DTRs.  Will reconsult as necessary.

## 2017-11-17 NOTE — DISCHARGE NOTE ADULT - CARE PROVIDER_API CALL
Jaziel Gregory (DO), Neurology  [address] [address]  Phone: [phone]  Fax: [phone] Jaziel Gregory (DO), Neurology  [address] [facility] [address]  [place] [address]  Phone: [phone]  Fax: [phone]    Delmer Sorto), Cardiovascular Disease; Internal Medicine  [address] [facility] [address]  [place] [address]  Phone: [phone]  Fax: [phone]

## 2017-11-17 NOTE — PROGRESS NOTE ADULT - SUBJECTIVE AND OBJECTIVE BOX
Subjective: Patient seen and examined. No new events except as noted.     SUBJECTIVE/ROS:  No chest pain, or sob.       MEDICATIONS:  MEDICATIONS  (STANDING):  aspirin enteric coated 81 milliGRAM(s) Oral daily  dextrose 5%. 1000 milliLiter(s) (50 mL/Hr) IV Continuous <Continuous>  dextrose 50% Injectable 12.5 Gram(s) IV Push once  dextrose 50% Injectable 25 Gram(s) IV Push once  dextrose 50% Injectable 25 Gram(s) IV Push once  heparin  Injectable 5000 Unit(s) SubCutaneous every 12 hours  insulin lispro (HumaLOG) corrective regimen sliding scale   SubCutaneous three times a day before meals  losartan 25 milliGRAM(s) Oral daily  simvastatin 20 milliGRAM(s) Oral at bedtime  tamsulosin 0.4 milliGRAM(s) Oral at bedtime      PHYSICAL EXAM:  T(C): 36.6 (11-17-17 @ 09:46), Max: 36.6 (11-17-17 @ 09:46)  HR: 109 (11-17-17 @ 09:46) (72 - 109)  BP: 104/68 (11-17-17 @ 09:46) (104/68 - 121/69)  RR: 16 (11-17-17 @ 04:24) (16 - 18)  SpO2: 97% (11-17-17 @ 09:46) (96% - 97%)  Wt(kg): --  I&O's Summary    16 Nov 2017 07:01  -  17 Nov 2017 07:00  --------------------------------------------------------  IN: 1040 mL / OUT: 0 mL / NET: 1040 mL    17 Nov 2017 07:01  -  17 Nov 2017 12:49  --------------------------------------------------------  IN: 600 mL / OUT: 0 mL / NET: 600 mL        Weight (kg): 71.5 (11-16 @ 21:11)    Appearance: Normal	  HEENT:   Normal oral mucosa, PERRL, EOMI	  Cardiovascular: Normal S1 S2,    Murmur:   Neck: JVP normal  Respiratory: Lungs clear to auscultation  Gastrointestinal:  Soft, Non-tender, + BS	  Skin: normal   Neuro: No gross deficits.   Psychiatry:  Mood & affect appropriate  Ext: No edema      LABS/DATA:    CARDIAC MARKERS:  CARDIAC MARKERS ( 16 Nov 2017 05:19 )  x     / <0.01 ng/mL / x     / x     / x      CARDIAC MARKERS ( 15 Nov 2017 17:35 )  x     / <0.01 ng/mL / x     / x     / x      CARDIAC MARKERS ( 15 Nov 2017 08:32 )  x     / <0.01 ng/mL / x     / x     / x                                    15.1   6.4   )-----------( 161      ( 17 Nov 2017 05:21 )             43.8     11-17    142  |  106  |  14  ----------------------------<  147<H>  4.3   |  24  |  1.14    Ca    9.4      17 Nov 2017 05:21      proBNP:   Lipid Profile:   HgA1c:   TSH:     TELE:  EKG:

## 2017-11-17 NOTE — DISCHARGE NOTE ADULT - CARE PROVIDERS DIRECT ADDRESSES
[email or web address]

## 2017-11-17 NOTE — PROGRESS NOTE ADULT - SUBJECTIVE AND OBJECTIVE BOX
CHIEF COMPLAINT:Patient without new complaints   	        PAST MEDICAL & SURGICAL HISTORY:  HLD (hyperlipidemia)  HTN (hypertension)  DM (diabetes mellitus)          REVIEW OF SYSTEMS:  CONSTITUTIONAL: No fever, weight loss, or fatigue  EYES: No eye pain, visual disturbances, or discharge  NECK: No pain or stiffness  RESPIRATORY: No cough, wheezing, chills or hemoptysis; No Shortness of Breath  CARDIOVASCULAR: No chest pain, palpitations, passing out, dizziness, or leg swelling  GASTROINTESTINAL: No abdominal or epigastric pain. No nausea, vomiting, or hematemesis; No diarrhea or constipation. No melena or hematochezia.  GENITOURINARY: No dysuria, frequency, hematuria, or incontinence  NEUROLOGICAL: No headaches, memory loss, loss of strength, numbness, or tremors  feels better     Medications:  MEDICATIONS  (STANDING):  aspirin enteric coated 81 milliGRAM(s) Oral daily  dextrose 5%. 1000 milliLiter(s) (50 mL/Hr) IV Continuous <Continuous>  dextrose 50% Injectable 12.5 Gram(s) IV Push once  dextrose 50% Injectable 25 Gram(s) IV Push once  dextrose 50% Injectable 25 Gram(s) IV Push once  heparin  Injectable 5000 Unit(s) SubCutaneous every 12 hours  insulin lispro (HumaLOG) corrective regimen sliding scale   SubCutaneous three times a day before meals  losartan 25 milliGRAM(s) Oral daily  simvastatin 20 milliGRAM(s) Oral at bedtime  tamsulosin 0.4 milliGRAM(s) Oral at bedtime    MEDICATIONS  (PRN):  dextrose Gel 1 Dose(s) Oral once PRN Blood Glucose LESS THAN 70 milliGRAM(s)/deciliter  glucagon  Injectable 1 milliGRAM(s) IntraMuscular once PRN Glucose LESS THAN 70 milligrams/deciliter    	    PHYSICAL EXAM:  T(C): 36.6 (11-17-17 @ 09:46), Max: 36.6 (11-17-17 @ 09:46)  HR: 109 (11-17-17 @ 09:46) (72 - 109)  BP: 104/68 (11-17-17 @ 09:46) (104/68 - 121/69)  RR: 16 (11-17-17 @ 04:24) (16 - 18)  SpO2: 97% (11-17-17 @ 09:46) (96% - 97%)  Wt(kg): --  I&O's Summary    16 Nov 2017 07:01  -  17 Nov 2017 07:00  --------------------------------------------------------  IN: 1040 mL / OUT: 0 mL / NET: 1040 mL    17 Nov 2017 07:01  -  17 Nov 2017 11:19  --------------------------------------------------------  IN: 600 mL / OUT: 0 mL / NET: 600 mL        Appearance: Normal	  HEENT:   Normal oral mucosa, PERRL, EOMI	  Lymphatic: No lymphadenopathy  Cardiovascular: Normal S1 S2, No JVD, No murmurs, No edema  Respiratory: Lungs clear to auscultation	  Psychiatry: A & O x 3, Mood & affect appropriate  Gastrointestinal:  Soft, Non-tender, + BS	  Skin: No rashes, No ecchymoses, No cyanosis	  Neurologic: Non-focal  Extremities: Normal range of motion, No clubbing, cyanosis or edema  Vascular: Peripheral pulses palpable 2+ bilaterally    TELEMETRY: 	    ECG:  	  RADIOLOGY:  OTHER: 	  	  LABS:	 	    CARDIAC MARKERS:  CARDIAC MARKERS ( 16 Nov 2017 05:19 )  x     / <0.01 ng/mL / x     / x     / x      CARDIAC MARKERS ( 15 Nov 2017 17:35 )  x     / <0.01 ng/mL / x     / x     / x                                    15.1   6.4   )-----------( 161      ( 17 Nov 2017 05:21 )             43.8     11-17    142  |  106  |  14  ----------------------------<  147<H>  4.3   |  24  |  1.14    Ca    9.4      17 Nov 2017 05:21      proBNP:   Lipid Profile:   HgA1c:   TSH:

## 2017-11-17 NOTE — DISCHARGE NOTE ADULT - ADDITIONAL INSTRUCTIONS
Follow up with your Primary Care Doctor within 1 week of discharge. Follow up with Neurologist, Dr. Gregory as an outpatient. Follow up with your Primary Care Doctor within 1 week of discharge. Follow up with Neurologist, Dr. Gregory as an outpatient. Follow up with Dr. Sorto within 1 week of discharge for echocardiogram as an outpatient.

## 2017-11-17 NOTE — DISCHARGE NOTE ADULT - CARE PLAN
Principal Discharge DX:	Neck pain  Goal:	Patient remains hemodynamically stable.  Instructions for follow-up, activity and diet:	follow up with Neurologist, Dr. Gregory as an outpatient.  Secondary Diagnosis:	HTN (hypertension)  Instructions for follow-up, activity and diet:	Low salt diet  Activity as tolerated.  Take all medication as prescribed.  Follow up with your medical doctor for routine blood pressure monitoring at your next visit.  Notify your doctor if you have any of the following symptoms:   Dizziness, Lightheadedness, Blurry vision, Headache, Chest pain, Shortness of breath  Secondary Diagnosis:	HLD (hyperlipidemia)  Instructions for follow-up, activity and diet:	Continue with current medication.  Secondary Diagnosis:	DM (diabetes mellitus)  Instructions for follow-up, activity and diet:	HgA1C this admission 6.1.  Make sure you get your HgA1c checked every three months.  If you take oral diabetes medications, check your blood glucose two times a day.  If you take insulin, check your blood glucose before meals and at bedtime.  It's important not to skip any meals.  Keep a log of your blood glucose results and always take it with you to your doctor appointments.  Keep a list of your current medications including injectables and over the counter medications and bring this medication list with you to all your doctor appointments.  If you have not seen your opthalmologist this year call for appointment.  Check your feet daily for redness, sores, or openings. Do not self treat. If no improvement in two days call your primary care physician for an appointment.  Low blood sugar (hypoglycemia) is a blood sugar below 70mg/dl. Check your blood sugar if you feel signs/symptoms of hypoglycemia. If your blood sugar is below 70 take 15 grams of carbohydrates (ex 4 oz of apple juice, 3-4 glucosr tablets, or 4-6 oz of regular soda) wait 15 minutes and repeat blood sugar to make sure it comes up above 70.  If your blood sugar is above 70 and you are due for a meal, have a meal.  If you are not due for a meal have a snack.  This snack helps keeps your blood sugar at a safe range. Principal Discharge DX:	Neck pain  Goal:	Patient remains hemodynamically stable.  Instructions for follow-up, activity and diet:	follow up with Neurologist, Dr. Gregory as an outpatient.  Secondary Diagnosis:	HTN (hypertension)  Goal:	Follow up with Dr. Sorto for a echocardiogram.  Instructions for follow-up, activity and diet:	Low salt diet  Activity as tolerated.  Take all medication as prescribed.  Follow up with your medical doctor for routine blood pressure monitoring at your next visit.  Notify your doctor if you have any of the following symptoms:   Dizziness, Lightheadedness, Blurry vision, Headache, Chest pain, Shortness of breath  Secondary Diagnosis:	HLD (hyperlipidemia)  Instructions for follow-up, activity and diet:	Continue with current medication.  Secondary Diagnosis:	DM (diabetes mellitus)  Instructions for follow-up, activity and diet:	HgA1C this admission 6.1.  Make sure you get your HgA1c checked every three months.  If you take oral diabetes medications, check your blood glucose two times a day.  If you take insulin, check your blood glucose before meals and at bedtime.  It's important not to skip any meals.  Keep a log of your blood glucose results and always take it with you to your doctor appointments.  Keep a list of your current medications including injectables and over the counter medications and bring this medication list with you to all your doctor appointments.  If you have not seen your opthalmologist this year call for appointment.  Check your feet daily for redness, sores, or openings. Do not self treat. If no improvement in two days call your primary care physician for an appointment.  Low blood sugar (hypoglycemia) is a blood sugar below 70mg/dl. Check your blood sugar if you feel signs/symptoms of hypoglycemia. If your blood sugar is below 70 take 15 grams of carbohydrates (ex 4 oz of apple juice, 3-4 glucosr tablets, or 4-6 oz of regular soda) wait 15 minutes and repeat blood sugar to make sure it comes up above 70.  If your blood sugar is above 70 and you are due for a meal, have a meal.  If you are not due for a meal have a snack.  This snack helps keeps your blood sugar at a safe range.

## 2017-11-17 NOTE — PROGRESS NOTE ADULT - ASSESSMENT
62 year old male w/ PMHx DM, HTN, HLD, presenting with progressively worsening headache and neck pain. neurological exam non-focal, CT head/neck showing no acute abnormalities.  Symptoms possibly due to soft tissue infectious/inflammatory process.    Plan:  - f/u MRI brain and MRI neck for soft tissue 62 year old male w/ PMHx DM, HTN, HLD, presenting with progressively worsening headache and neck pain. neurological exam non-focal, CT head/neck showing no acute abnormalities.  Symptoms possibly due to soft tissue infectious/inflammatory process.    Plan:  - MRI Brain and MRA unremarkable  - no further inpatient neuro work-up at this time  - continue with supportive care as per primary team  - will sign off; reconsult prn  - d/c planning as per primary team

## 2017-11-17 NOTE — PROGRESS NOTE ADULT - SUBJECTIVE AND OBJECTIVE BOX
HPI:  62yr old male DMT2,HTN,HLD,. Pt comes to ed complains of 2-3 week hx of HA, with "pulling pain rt neck and shoulder with numbness. With tongue stiffness" No nausea and vomiting, no fever. , No alleviating factors.Pain sharp 8/10. No trauma. Hx of ha in past not as severe.    Interval History -        Subjective:    MEDICATIONS  (STANDING):  aspirin enteric coated 81 milliGRAM(s) Oral daily  dextrose 5%. 1000 milliLiter(s) (50 mL/Hr) IV Continuous <Continuous>  dextrose 50% Injectable 12.5 Gram(s) IV Push once  dextrose 50% Injectable 25 Gram(s) IV Push once  dextrose 50% Injectable 25 Gram(s) IV Push once  heparin  Injectable 5000 Unit(s) SubCutaneous every 12 hours  insulin lispro (HumaLOG) corrective regimen sliding scale   SubCutaneous three times a day before meals  losartan 25 milliGRAM(s) Oral daily  simvastatin 20 milliGRAM(s) Oral at bedtime  tamsulosin 0.4 milliGRAM(s) Oral at bedtime    MEDICATIONS  (PRN):  dextrose Gel 1 Dose(s) Oral once PRN Blood Glucose LESS THAN 70 milliGRAM(s)/deciliter  glucagon  Injectable 1 milliGRAM(s) IntraMuscular once PRN Glucose LESS THAN 70 milligrams/deciliter      Allergies    Allergy Status Unknown    Intolerances        Objective:   Vital Signs Last 24 Hrs  T(C): 36.4 (17 Nov 2017 04:24), Max: 36.5 (16 Nov 2017 21:11)  T(F): 97.5 (17 Nov 2017 04:24), Max: 97.7 (16 Nov 2017 21:11)  HR: 72 (17 Nov 2017 04:24) (72 - 74)  BP: 113/71 (17 Nov 2017 04:24) (113/71 - 121/69)  BP(mean): --  RR: 16 (17 Nov 2017 04:24) (16 - 18)  SpO2: 97% (17 Nov 2017 04:24) (96% - 97%)    General Exam:   General appearance: No acute distress                   Neurological Exam:  Mental Status: Oriented to self, date and place.  Attention intact.  No dysarthria, aphasia or neglect.  Knowledge intact.  Registration intact.  Short and long term memory grossly intact.      Cranial Nerves: CN I - not tested.  PERRL, EOMI, VFF, no nystagmus or diplopia.  No APD.  Fundi not visualized bilaterally.  CN V1-3 intact to light touch and pinprick.  No facial asymmetry.  Hearing intact to finger rub bilaterally.  Tongue, uvula and palate midline.  Sternocleidomastoid and Trapezius intact bilaterally.    Motor:   Tone: normal.                  Strength: intact throughout  Pronator drift: none                 Dysmeria: None to finger-nose-finger or heel-shin-heel  No truncal ataxia.    Tremor: No resting, postural or action tremor.  No myoclonus.    Sensation: intact to light touch, pinprick, vibration and proprioception    Deep Tendon Reflexes: 1+ bilateral biceps, triceps, brachioradialis, knee and ankle  Toes flexor bilaterally    Gait: normal and stable.      Other:  CBC Full  -  ( 17 Nov 2017 05:21 )  WBC Count : 6.4 K/uL  Hemoglobin : 15.1 g/dL  Hematocrit : 43.8 %  Platelet Count - Automated : 161 K/uL  Mean Cell Volume : 91.5 fl  Mean Cell Hemoglobin : 31.7 pg  Mean Cell Hemoglobin Concentration : 34.6 gm/dL  Auto Neutrophil # : x  Auto Lymphocyte # : x  Auto Monocyte # : x  Auto Eosinophil # : x  Auto Basophil # : x  Auto Neutrophil % : x  Auto Lymphocyte % : x  Auto Monocyte % : x  Auto Eosinophil % : x  Auto Basophil % : x    11-17    142  |  106  |  14  ----------------------------<  147<H>  4.3   |  24  |  1.14    Ca    9.4      17 Nov 2017 05:21  Mg     2.3     11-15    TPro  7.6  /  Alb  4.8  /  TBili  0.3  /  DBili  x   /  AST  21  /  ALT  21  /  AlkPhos  54  11-15    11-17    142  |  106  |  14  ----------------------------<  147<H>  4.3   |  24  |  1.14    Ca    9.4      17 Nov 2017 05:21  Mg     2.3     11-15    TPro  7.6  /  Alb  4.8  /  TBili  0.3  /  DBili  x   /  AST  21  /  ALT  21  /  AlkPhos  54  11-15    LIVER FUNCTIONS - ( 15 Nov 2017 08:32 )  Alb: 4.8 g/dL / Pro: 7.6 g/dL / ALK PHOS: 54 U/L / ALT: 21 U/L RC / AST: 21 U/L / GGT: x             Imaging: Subjective:    Pt seen and examined at bedside. No acute events overnight.     MEDICATIONS  (STANDING):  aspirin enteric coated 81 milliGRAM(s) Oral daily  dextrose 5%. 1000 milliLiter(s) (50 mL/Hr) IV Continuous <Continuous>  dextrose 50% Injectable 12.5 Gram(s) IV Push once  dextrose 50% Injectable 25 Gram(s) IV Push once  dextrose 50% Injectable 25 Gram(s) IV Push once  heparin  Injectable 5000 Unit(s) SubCutaneous every 12 hours  insulin lispro (HumaLOG) corrective regimen sliding scale   SubCutaneous three times a day before meals  losartan 25 milliGRAM(s) Oral daily  simvastatin 20 milliGRAM(s) Oral at bedtime  tamsulosin 0.4 milliGRAM(s) Oral at bedtime    MEDICATIONS  (PRN):  dextrose Gel 1 Dose(s) Oral once PRN Blood Glucose LESS THAN 70 milliGRAM(s)/deciliter  glucagon  Injectable 1 milliGRAM(s) IntraMuscular once PRN Glucose LESS THAN 70 milligrams/deciliter      Allergies    Allergy Status Unknown    Intolerances        Objective:   Vital Signs Last 24 Hrs  T(C): 36.4 (17 Nov 2017 04:24), Max: 36.5 (16 Nov 2017 21:11)  T(F): 97.5 (17 Nov 2017 04:24), Max: 97.7 (16 Nov 2017 21:11)  HR: 72 (17 Nov 2017 04:24) (72 - 74)  BP: 113/71 (17 Nov 2017 04:24) (113/71 - 121/69)  BP(mean): --  RR: 16 (17 Nov 2017 04:24) (16 - 18)  SpO2: 97% (17 Nov 2017 04:24) (96% - 97%)    Exam:   awake, alert speech fluent  EOMI, face symmetric  no drift x 4    Other:  CBC Full  -  ( 17 Nov 2017 05:21 )  WBC Count : 6.4 K/uL  Hemoglobin : 15.1 g/dL  Hematocrit : 43.8 %  Platelet Count - Automated : 161 K/uL  Mean Cell Volume : 91.5 fl  Mean Cell Hemoglobin : 31.7 pg  Mean Cell Hemoglobin Concentration : 34.6 gm/dL  Auto Neutrophil # : x  Auto Lymphocyte # : x  Auto Monocyte # : x  Auto Eosinophil # : x  Auto Basophil # : x  Auto Neutrophil % : x  Auto Lymphocyte % : x  Auto Monocyte % : x  Auto Eosinophil % : x  Auto Basophil % : x    11-17    142  |  106  |  14  ----------------------------<  147<H>  4.3   |  24  |  1.14    Ca    9.4      17 Nov 2017 05:21  Mg     2.3     11-15    TPro  7.6  /  Alb  4.8  /  TBili  0.3  /  DBili  x   /  AST  21  /  ALT  21  /  AlkPhos  54  11-15    11-17    142  |  106  |  14  ----------------------------<  147<H>  4.3   |  24  |  1.14    Ca    9.4      17 Nov 2017 05:21  Mg     2.3     11-15    TPro  7.6  /  Alb  4.8  /  TBili  0.3  /  DBili  x   /  AST  21  /  ALT  21  /  AlkPhos  54  11-15    LIVER FUNCTIONS - ( 15 Nov 2017 08:32 )  Alb: 4.8 g/dL / Pro: 7.6 g/dL / ALK PHOS: 54 U/L / ALT: 21 U/L RC / AST: 21 U/L / GGT: x             Imaging:     MRI Brain w/wo: Microvascular disease. No acute or subacute infarction. No abnormally   enhancing mass along either CPA, internal auditory canal, or abnormal   enhancement along either seventh or eighth cranial nerves, or inner ear.    MRA Head/Neck:   Intracranial MR angiography is within normal limits.  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 Atherosclerotic disease along the left carotid   bifurcation without flow-limiting stenosis by NASCET criteria.

## 2017-11-17 NOTE — DISCHARGE NOTE ADULT - HOSPITAL COURSE
62 year old male w/ PMHx DM, HTN, HLD, presenting with progressively worsening headache and neck pain. neurological exam non-focal, CT head/neck showing no acute abnormalities.  Symptoms possibly due to soft tissue infectious/inflammatory process.    1. Neck/Shoulder pain  - MRI Brain and MRA unremarkable  - no further inpatient neuro work-up at this time    2. HTN: Continue with current medication.    3. Diabetes: Continue with current medication.     Patient stable discharged home with outpatient follow up with PMD within 1 week and Neurologist, Dr. Gregory as an outpatient.

## 2017-11-20 DIAGNOSIS — R69 ILLNESS, UNSPECIFIED: ICD-10-CM

## 2017-11-23 DIAGNOSIS — Z79.82 LONG TERM (CURRENT) USE OF ASPIRIN: ICD-10-CM

## 2017-11-23 DIAGNOSIS — E78.5 HYPERLIPIDEMIA, UNSPECIFIED: ICD-10-CM

## 2017-11-23 DIAGNOSIS — M79.89 OTHER SPECIFIED SOFT TISSUE DISORDERS: ICD-10-CM

## 2017-11-23 DIAGNOSIS — E11.9 TYPE 2 DIABETES MELLITUS WITHOUT COMPLICATIONS: ICD-10-CM

## 2017-11-23 DIAGNOSIS — I10 ESSENTIAL (PRIMARY) HYPERTENSION: ICD-10-CM

## 2017-11-23 DIAGNOSIS — Z79.84 LONG TERM (CURRENT) USE OF ORAL HYPOGLYCEMIC DRUGS: ICD-10-CM

## 2017-11-23 RX ORDER — LOSARTAN POTASSIUM 100 MG/1
1 TABLET, FILM COATED ORAL
Qty: 0 | Refills: 0 | COMMUNITY

## 2017-11-23 RX ORDER — SITAGLIPTIN AND METFORMIN HYDROCHLORIDE 500; 50 MG/1; MG/1
1 TABLET, FILM COATED ORAL
Qty: 0 | Refills: 0 | COMMUNITY

## 2017-11-23 RX ORDER — TAMSULOSIN HYDROCHLORIDE 0.4 MG/1
2 CAPSULE ORAL
Qty: 0 | Refills: 0 | COMMUNITY

## 2017-11-23 RX ORDER — SIMVASTATIN 20 MG/1
1 TABLET, FILM COATED ORAL
Qty: 0 | Refills: 0 | COMMUNITY

## 2017-11-23 RX ORDER — ASPIRIN/CALCIUM CARB/MAGNESIUM 324 MG
1 TABLET ORAL
Qty: 0 | Refills: 0 | COMMUNITY

## 2018-03-01 ENCOUNTER — OUTPATIENT (OUTPATIENT)
Dept: OUTPATIENT SERVICES | Facility: HOSPITAL | Age: 63
LOS: 1 days | End: 2018-03-01
Payer: MEDICAID

## 2018-03-01 PROCEDURE — G9001: CPT

## 2018-03-05 DIAGNOSIS — R69 ILLNESS, UNSPECIFIED: ICD-10-CM

## 2018-03-06 PROBLEM — E11.9 TYPE 2 DIABETES MELLITUS WITHOUT COMPLICATIONS: Chronic | Status: ACTIVE | Noted: 2017-11-15

## 2018-03-06 PROBLEM — E78.5 HYPERLIPIDEMIA, UNSPECIFIED: Chronic | Status: ACTIVE | Noted: 2017-11-15

## 2018-03-06 PROBLEM — I10 ESSENTIAL (PRIMARY) HYPERTENSION: Chronic | Status: ACTIVE | Noted: 2017-11-15

## 2019-02-22 NOTE — ED PROVIDER NOTE - CARDIAC, MLM
Left message on voicemail.   Normal rate, regular rhythm.  Heart sounds S1, S2.  No murmurs, rubs or gallops.
